# Patient Record
Sex: FEMALE | Race: ASIAN | NOT HISPANIC OR LATINO | URBAN - METROPOLITAN AREA
[De-identification: names, ages, dates, MRNs, and addresses within clinical notes are randomized per-mention and may not be internally consistent; named-entity substitution may affect disease eponyms.]

---

## 2021-02-24 ENCOUNTER — EMERGENCY (EMERGENCY)
Facility: HOSPITAL | Age: 46
LOS: 1 days | Discharge: HOME | End: 2021-02-24
Attending: EMERGENCY MEDICINE | Admitting: EMERGENCY MEDICINE
Payer: COMMERCIAL

## 2021-02-24 VITALS
OXYGEN SATURATION: 99 % | HEART RATE: 75 BPM | TEMPERATURE: 98 F | SYSTOLIC BLOOD PRESSURE: 110 MMHG | DIASTOLIC BLOOD PRESSURE: 70 MMHG | RESPIRATION RATE: 16 BRPM

## 2021-02-24 VITALS
HEART RATE: 88 BPM | SYSTOLIC BLOOD PRESSURE: 124 MMHG | RESPIRATION RATE: 16 BRPM | TEMPERATURE: 97 F | DIASTOLIC BLOOD PRESSURE: 67 MMHG | OXYGEN SATURATION: 99 %

## 2021-02-24 DIAGNOSIS — N13.9 OBSTRUCTIVE AND REFLUX UROPATHY, UNSPECIFIED: ICD-10-CM

## 2021-02-24 LAB
ALBUMIN SERPL ELPH-MCNC: 4.5 G/DL — SIGNIFICANT CHANGE UP (ref 3.3–5)
ALP SERPL-CCNC: 74 U/L — SIGNIFICANT CHANGE UP (ref 40–120)
ALT FLD-CCNC: 10 U/L — SIGNIFICANT CHANGE UP (ref 4–33)
ANION GAP SERPL CALC-SCNC: 11 MMOL/L — SIGNIFICANT CHANGE UP (ref 7–14)
APPEARANCE UR: CLEAR — SIGNIFICANT CHANGE UP
AST SERPL-CCNC: 14 U/L — SIGNIFICANT CHANGE UP (ref 4–32)
BASOPHILS # BLD AUTO: 0 K/UL — SIGNIFICANT CHANGE UP (ref 0–0.2)
BASOPHILS NFR BLD AUTO: 0 % — SIGNIFICANT CHANGE UP (ref 0–2)
BILIRUB SERPL-MCNC: 0.8 MG/DL — SIGNIFICANT CHANGE UP (ref 0.2–1.2)
BILIRUB UR-MCNC: NEGATIVE — SIGNIFICANT CHANGE UP
BLOOD GAS VENOUS COMPREHENSIVE RESULT: SIGNIFICANT CHANGE UP
BUN SERPL-MCNC: 16 MG/DL — SIGNIFICANT CHANGE UP (ref 7–23)
CALCIUM SERPL-MCNC: 9.3 MG/DL — SIGNIFICANT CHANGE UP (ref 8.4–10.5)
CHLORIDE SERPL-SCNC: 99 MMOL/L — SIGNIFICANT CHANGE UP (ref 98–107)
CO2 SERPL-SCNC: 24 MMOL/L — SIGNIFICANT CHANGE UP (ref 22–31)
COLOR SPEC: YELLOW — SIGNIFICANT CHANGE UP
CREAT SERPL-MCNC: 1.08 MG/DL — SIGNIFICANT CHANGE UP (ref 0.5–1.3)
DIFF PNL FLD: ABNORMAL
EOSINOPHIL # BLD AUTO: 0.11 K/UL — SIGNIFICANT CHANGE UP (ref 0–0.5)
EOSINOPHIL NFR BLD AUTO: 1 % — SIGNIFICANT CHANGE UP (ref 0–6)
GLUCOSE SERPL-MCNC: 100 MG/DL — HIGH (ref 70–99)
GLUCOSE UR QL: NEGATIVE — SIGNIFICANT CHANGE UP
HCT VFR BLD CALC: 37.2 % — SIGNIFICANT CHANGE UP (ref 34.5–45)
HGB BLD-MCNC: 12.1 G/DL — SIGNIFICANT CHANGE UP (ref 11.5–15.5)
IANC: 10.32 K/UL — HIGH (ref 1.5–8.5)
KETONES UR-MCNC: NEGATIVE — SIGNIFICANT CHANGE UP
LEUKOCYTE ESTERASE UR-ACNC: NEGATIVE — SIGNIFICANT CHANGE UP
LYMPHOCYTES # BLD AUTO: 0.11 K/UL — LOW (ref 1–3.3)
LYMPHOCYTES # BLD AUTO: 1 % — LOW (ref 13–44)
MCHC RBC-ENTMCNC: 28.8 PG — SIGNIFICANT CHANGE UP (ref 27–34)
MCHC RBC-ENTMCNC: 32.5 GM/DL — SIGNIFICANT CHANGE UP (ref 32–36)
MCV RBC AUTO: 88.6 FL — SIGNIFICANT CHANGE UP (ref 80–100)
MONOCYTES # BLD AUTO: 0.57 K/UL — SIGNIFICANT CHANGE UP (ref 0–0.9)
MONOCYTES NFR BLD AUTO: 5 % — SIGNIFICANT CHANGE UP (ref 2–14)
NEUTROPHILS # BLD AUTO: 10.35 K/UL — HIGH (ref 1.8–7.4)
NEUTROPHILS NFR BLD AUTO: 91 % — HIGH (ref 43–77)
NITRITE UR-MCNC: NEGATIVE — SIGNIFICANT CHANGE UP
PH UR: 6 — SIGNIFICANT CHANGE UP (ref 5–8)
PLATELET # BLD AUTO: 261 K/UL — SIGNIFICANT CHANGE UP (ref 150–400)
POTASSIUM SERPL-MCNC: 3.7 MMOL/L — SIGNIFICANT CHANGE UP (ref 3.5–5.3)
POTASSIUM SERPL-SCNC: 3.7 MMOL/L — SIGNIFICANT CHANGE UP (ref 3.5–5.3)
PROT SERPL-MCNC: 7.4 G/DL — SIGNIFICANT CHANGE UP (ref 6–8.3)
PROT UR-MCNC: ABNORMAL
RBC # BLD: 4.2 M/UL — SIGNIFICANT CHANGE UP (ref 3.8–5.2)
RBC # FLD: 12.3 % — SIGNIFICANT CHANGE UP (ref 10.3–14.5)
SARS-COV-2 RNA SPEC QL NAA+PROBE: SIGNIFICANT CHANGE UP
SODIUM SERPL-SCNC: 134 MMOL/L — LOW (ref 135–145)
SP GR SPEC: 1.02 — SIGNIFICANT CHANGE UP (ref 1.01–1.02)
UROBILINOGEN FLD QL: SIGNIFICANT CHANGE UP
WBC # BLD: 11.37 K/UL — HIGH (ref 3.8–10.5)
WBC # FLD AUTO: 11.37 K/UL — HIGH (ref 3.8–10.5)

## 2021-02-24 PROCEDURE — 74176 CT ABD & PELVIS W/O CONTRAST: CPT | Mod: 26

## 2021-02-24 PROCEDURE — 99282 EMERGENCY DEPT VISIT SF MDM: CPT

## 2021-02-24 PROCEDURE — 99285 EMERGENCY DEPT VISIT HI MDM: CPT

## 2021-02-24 RX ORDER — SODIUM CHLORIDE 9 MG/ML
1000 INJECTION INTRAMUSCULAR; INTRAVENOUS; SUBCUTANEOUS ONCE
Refills: 0 | Status: COMPLETED | OUTPATIENT
Start: 2021-02-24 | End: 2021-02-24

## 2021-02-24 RX ORDER — KETOROLAC TROMETHAMINE 30 MG/ML
15 SYRINGE (ML) INJECTION ONCE
Refills: 0 | Status: DISCONTINUED | OUTPATIENT
Start: 2021-02-24 | End: 2021-02-24

## 2021-02-24 RX ORDER — MORPHINE SULFATE 50 MG/1
4 CAPSULE, EXTENDED RELEASE ORAL ONCE
Refills: 0 | Status: DISCONTINUED | OUTPATIENT
Start: 2021-02-24 | End: 2021-02-24

## 2021-02-24 RX ORDER — ONDANSETRON 8 MG/1
1 TABLET, FILM COATED ORAL
Qty: 8 | Refills: 0
Start: 2021-02-24 | End: 2021-02-25

## 2021-02-24 RX ORDER — ONDANSETRON 8 MG/1
4 TABLET, FILM COATED ORAL ONCE
Refills: 0 | Status: COMPLETED | OUTPATIENT
Start: 2021-02-24 | End: 2021-02-24

## 2021-02-24 RX ORDER — SODIUM CHLORIDE 9 MG/ML
1000 INJECTION, SOLUTION INTRAVENOUS
Refills: 0 | Status: DISCONTINUED | OUTPATIENT
Start: 2021-02-24 | End: 2021-02-27

## 2021-02-24 RX ORDER — TAMSULOSIN HYDROCHLORIDE 0.4 MG/1
0.4 CAPSULE ORAL AT BEDTIME
Refills: 0 | Status: DISCONTINUED | OUTPATIENT
Start: 2021-02-24 | End: 2021-02-27

## 2021-02-24 RX ADMIN — TAMSULOSIN HYDROCHLORIDE 0.4 MILLIGRAM(S): 0.4 CAPSULE ORAL at 15:09

## 2021-02-24 RX ADMIN — SODIUM CHLORIDE 1000 MILLILITER(S): 9 INJECTION INTRAMUSCULAR; INTRAVENOUS; SUBCUTANEOUS at 10:38

## 2021-02-24 RX ADMIN — ONDANSETRON 4 MILLIGRAM(S): 8 TABLET, FILM COATED ORAL at 11:44

## 2021-02-24 RX ADMIN — MORPHINE SULFATE 4 MILLIGRAM(S): 50 CAPSULE, EXTENDED RELEASE ORAL at 14:21

## 2021-02-24 RX ADMIN — Medication 15 MILLIGRAM(S): at 10:41

## 2021-02-24 RX ADMIN — MORPHINE SULFATE 4 MILLIGRAM(S): 50 CAPSULE, EXTENDED RELEASE ORAL at 11:44

## 2021-02-24 RX ADMIN — SODIUM CHLORIDE 1000 MILLILITER(S): 9 INJECTION INTRAMUSCULAR; INTRAVENOUS; SUBCUTANEOUS at 11:44

## 2021-02-24 NOTE — ED PROVIDER NOTE - PATIENT PORTAL LINK FT
You can access the FollowMyHealth Patient Portal offered by [facility] by registering at the following website: [email or web address]. By joining [facility]’s FollowMyHealth portal, you will also be able to view your health information using other applications (apps) compatible with our system.

## 2021-02-24 NOTE — ED ADULT NURSE REASSESSMENT NOTE - NS ED NURSE REASSESS COMMENT FT1
Pt awake, alert and oriented x 4 c/o left flank pain and left lower quadrant/pelvic pain.   Denies n/v.   Denies chest pain or shortness of breath.  JUAN RAMON Terry aware of pain 7/10 - awaiting morphine order.   Warm packs given to pt.
Pt awake, alert and oriented x 4 denies flank pain or abdominal pain.   Denies n/v.   Urinated without difficulty.   Awaiting discharge.
Pt resting comfortably in bed, c/o increased flank pain at this time, VSS, meds given as ordered, awaiting CT,  will continue to monitor.

## 2021-02-24 NOTE — CONSULT NOTE ADULT - PROBLEM SELECTOR RECOMMENDATION 9
Agree w CDU admit  Pain Control  Aggressive IVF hydration  Antiemetics  Continue Flomax  Monitor for fevers  NPO after midnight  AM labs Agree w CDU admit  Pain Control  Aggressive IVF hydration  Strain voids send stone for analysis if passed  Antiemetics  Continue Flomax  Monitor for fevers  NPO after midnight  AM labs

## 2021-02-24 NOTE — ED PROVIDER NOTE - NSFOLLOWUPINSTRUCTIONS_ED_ALL_ED_FT
Follow up with Urology this week-referral list provided  Drink plenty of fluids  Take Flomax 0.4mg daily    Take Ibuprofen 600mg every 8 hours as needed for MODERATE pain  and/or Tylenol 650mg every 8 hours as needed for MODERATE pain    Take Oxycodone 5mg once every 6 hours as needed for SEVERE pain -- causes drowsiness; DO NOT drink alcohol, drive, or operate heavy machinery with this medication.     Take Zofran 4mg under the tongue every 8 hours as needed for nausea/vomiting.    Worsening, continued or new concerning symptoms, fever, vomiting, return to the emergency department. Follow up with Urology this week-referral list provided  Drink plenty of fluids  Keep the kidney stone for analysis     Take Ibuprofen 600mg every 8 hours as needed for MODERATE pain    Take Percocet 5mg once every 6 hours as needed for SEVERE pain -- causes drowsiness; DO NOT drink alcohol, drive, or operate heavy machinery with this medication.     Take Zofran 4mg under the tongue every 8 hours as needed for nausea/vomiting.    Worsening, continued or new concerning symptoms, fever, vomiting, return to the emergency department.

## 2021-02-24 NOTE — ED ADULT NURSE NOTE - OBJECTIVE STATEMENT
Pt presents to intake, A&Ox4, ambulatory w/o assistance, here for evaluation of left flank pain since 4am this morning. Pt states she had dental work done yesterday and was given percocet which she took for the flank pain. Pt states at 8am pain was worse that she became nauseous. Pt no longer nauseous and states pain is tolerable. Pt states "I'm scared the pain will come back because when it comes back its horrible, can I get more pain medicine to keep the pain away." JUAN RAMON Triplett made aware. Meds given as ordered. Denies chest pain, shortness of breath, palpitations, diaphoresis, headaches, fevers, dizziness, nausea, vomiting, diarrhea, or urinary symptoms at this time. IV established in left arm with a 18G, labs drawn and sent, call bell in reach, warm blanket provided, bed in lowest position, side rails up x2, MD evaluation in progress. Will continue to monitor.

## 2021-02-24 NOTE — ED ADULT TRIAGE NOTE - CHIEF COMPLAINT QUOTE
Pt states that she started having left flank pain at 0300.  Pt also states that she has been vomiting.  Pt states that she had dental work and was given Percocet, pt states that she took 1 at 0830.  Pt denies past medical history

## 2021-02-24 NOTE — ED PROVIDER NOTE - PROGRESS NOTE DETAILS
urology paged, pt accepted to cdu for pain control JUAN RAMON Tinsley- pt no longer wants to stay in [facility]U. pt in RW. pt states she is feeling much better, pain completely resolved thinks she may have passed stone. tolerating PO. will dc home pt passed stone in ED. as per urology, pt to keep stone and have analysis performed outpatient at [address]. pt has percocet at home

## 2021-02-24 NOTE — ED PROVIDER NOTE - OBJECTIVE STATEMENT
46 y/o female with no pmhx presents to ED c/o acute onset of left flank pain x 6 hours. Pt states pain woke her up from sleep, pain is intermittent. Pain was severe with 4 episodes of nbnb vomiting and urinary urgency. Pt currently menstruating. Pt took percocet she had at home given she had a tooth extraction yesterday. Percocet help flank pain. Deferring pain medication at this time. No fever, chills, cp, sob, dizziness, abd pain, diarrhea. No history of kidney stones.

## 2021-02-24 NOTE — CONSULT NOTE ADULT - SUBJECTIVE AND OBJECTIVE BOX
HPI:  46 y/o female with no pmhx presents to ED c/o acute onset of left flank pain x 12 hours. Pt states pain woke her up from sleep, pain is intermittent. Pain was severe with 4 episodes of vomiting and mild urinary urgency. Pt currently menstruating.    No fever, chills, cp, sob, dizziness, abd pain, diarrhea. No history of kidney stones    PAST MEDICAL & SURGICAL HISTORY:    No pertinent past medical history    No significant past surgical history        MEDICATIONS:  None      FAMILY HISTORY:    Non-Contriburory    Allergies    No Known Allergies        SOCIAL HISTORY:    Non Smoker  Works as a       REVIEW OF SYSTEMS: Otherwise negative as stated in HPI      Vital Signs Last 24 Hrs  T(C): 36.4 (2021 13:31), Max: 36.4 (2021 13:31)  T(F): 97.5 (2021 13:31), Max: 97.5 (2021 13:31)  HR: 75 (2021 13:31) (75 - 88)  BP: 110/70 (2021 13:31) (100/66 - 124/67)  BP(mean): --  RR: 16 (2021 13:31) (16 - 16)  SpO2: 99% (2021 13:31) (99% - 100%)    PHYSICAL EXAM:    General: [x ] Awake and Alert in no acute distress    Respiratory and Thorax: [ x ] no resp distress   	  Cardiovascular: [x ] S1,S2 Reg Rate    Gastrointestinal: [x ]soft  [x ] non tender, CVAT [ ]Y  [x ]N                            Musculoskeletal / Extremities:  Edema [ ]Y [x ]N,  AROM x 4 [x ]Y  [ ]N  	          LABS:                        12.1   11.37 )-----------( 261      ( 2021 10:55 )             37.2     02-24    134<L>  |  99  |  16  ----------------------------<  100<H>  3.7   |  24  |  1.08    Ca    9.3      2021 11:23    TPro  7.4  /  Alb  4.5  /  TBili  0.8  /  DBili  x   /  AST  14  /  ALT  10  /  AlkPhos  74  02-24      Urinalysis Basic - ( 2021 10:29 )    Color: Yellow / Appearance: Clear / S.019 / pH: x  Gluc: x / Ketone: Negative  / Bili: Negative / Urobili: <2 mg/dL   Blood: x / Protein: 30 mg/dL / Nitrite: Negative   Leuk Esterase: Negative / RBC: 3 /HPF / WBC 1 /HPF   Sq Epi: x / Non Sq Epi: 1 /HPF / Bacteria: x      URINE CX:  pend    RADIOLOGY:    < from: CT Abdomen and Pelvis No Cont (21 @ 13:24) >    EXAM:  CT ABDOMEN AND PELVIS        PROCEDURE DATE:  2021         INTERPRETATION:  CLINICAL INFORMATION: Left flank pain, urinary urgency, vomiting.    COMPARISON: None.    PROCEDURE:  CT of the Abdomen and Pelvis was performed without intravenous contrast in the prone position.  Intravenous contrast: None.  Oral contrast: None.  Sagittal and coronal reformats were performed.    FINDINGS:  LOWER CHEST: Within normal limits.    LIVER: Within normal limits.  BILE DUCTS: Normal caliber.  GALLBLADDER: Within normal limits.  SPLEEN: Within normal limits.  PANCREAS: Within normal limits.  ADRENALS: Within normal limits.  KIDNEYS/URETERS: A 4 mm angiomyolipoma in the lower pole of right kidney. No renal or ureteral stone on the right. Left kidney is enlarged with mild perinephric fatty stranding and trace perinephric fluid. There is mild left hydronephrosis secondary to a 3 mm stone at the left UVJ.    BLADDER: Within normal limits.  REPRODUCTIVE ORGANS: Retroverted with IUD in place insatisfactory location. No adnexal mass. Trace free fluid in the cul-de-sac is likely physiologic.    BOWEL: No bowel obstruction. Appendix is normal.  PERITONEUM: No ascites.  VESSELS: Within normal limits.  RETROPERITONEUM/LYMPH NODES: No lymphadenopathy.  ABDOMINAL WALL: Within normal limits.  BONES: Mild to moderate degenerative changes at L5-S1.    IMPRESSION:  3 mm stone at the left UVJ with associated mild left hydroureteronephrosis.                KAREL RAY MD; Attending Radiologist  This document has been electronically signed. 2021  1:39PM    < end of copied text >

## 2021-02-24 NOTE — ED PROVIDER NOTE - CLINICAL SUMMARY MEDICAL DECISION MAKING FREE TEXT BOX
Nasrin: Awoke w/ L flank pain, colicky, urine urgency. No F/chills. Less likely AAA (few CV risks). Check UA, Cr, CT for renal stone. Give IVF.

## 2021-02-24 NOTE — ED PROVIDER NOTE - ATTENDING CONTRIBUTION TO CARE
I performed a face-to-face evaluation of the patient and performed a history and physical examination. I agree with the history and physical examination.    Nasrin: Awoke w/ L flank pain, colicky, urine urgency. No F/chills. Less likely AAA (few CV risks). Check UA, Cr, CT for renal stone. Give IVF.

## 2021-02-25 LAB
CULTURE RESULTS: NO GROWTH — SIGNIFICANT CHANGE UP
SPECIMEN SOURCE: SIGNIFICANT CHANGE UP

## 2025-01-09 ENCOUNTER — APPOINTMENT (OUTPATIENT)
Dept: SURGERY | Facility: CLINIC | Age: 50
End: 2025-01-09
Payer: COMMERCIAL

## 2025-01-09 VITALS
OXYGEN SATURATION: 96 % | HEIGHT: 64 IN | BODY MASS INDEX: 21.51 KG/M2 | SYSTOLIC BLOOD PRESSURE: 117 MMHG | WEIGHT: 126 LBS | HEART RATE: 70 BPM | DIASTOLIC BLOOD PRESSURE: 73 MMHG

## 2025-01-09 DIAGNOSIS — Z78.9 OTHER SPECIFIED HEALTH STATUS: ICD-10-CM

## 2025-01-09 DIAGNOSIS — E04.2 NONTOXIC MULTINODULAR GOITER: ICD-10-CM

## 2025-01-09 PROCEDURE — G2211 COMPLEX E/M VISIT ADD ON: CPT | Mod: NC

## 2025-01-09 PROCEDURE — 99204 OFFICE O/P NEW MOD 45 MIN: CPT

## 2025-01-10 PROBLEM — Z78.9 DENIES ALCOHOL CONSUMPTION: Status: ACTIVE | Noted: 2025-01-10

## 2025-01-10 PROBLEM — Z78.9 NON-SMOKER: Status: ACTIVE | Noted: 2025-01-10

## 2025-01-21 ENCOUNTER — NON-APPOINTMENT (OUTPATIENT)
Age: 50
End: 2025-01-21

## 2025-03-07 ENCOUNTER — RESULT REVIEW (OUTPATIENT)
Age: 50
End: 2025-03-07

## 2025-03-17 ENCOUNTER — NON-APPOINTMENT (OUTPATIENT)
Age: 50
End: 2025-03-17

## 2025-03-17 DIAGNOSIS — E04.2 NONTOXIC MULTINODULAR GOITER: ICD-10-CM

## 2025-04-09 ENCOUNTER — APPOINTMENT (OUTPATIENT)
Dept: SURGERY | Facility: HOSPITAL | Age: 50
End: 2025-04-09

## 2025-06-13 ENCOUNTER — NON-APPOINTMENT (OUTPATIENT)
Age: 50
End: 2025-06-13